# Patient Record
Sex: MALE | Race: BLACK OR AFRICAN AMERICAN | NOT HISPANIC OR LATINO | Employment: OTHER | URBAN - METROPOLITAN AREA
[De-identification: names, ages, dates, MRNs, and addresses within clinical notes are randomized per-mention and may not be internally consistent; named-entity substitution may affect disease eponyms.]

---

## 2020-09-26 PROBLEM — J93.11 PRIMARY SPONTANEOUS PNEUMOTHORAX: Status: ACTIVE | Noted: 2020-09-26

## 2020-09-27 PROBLEM — J93.11 PRIMARY SPONTANEOUS PNEUMOTHORAX: Chronic | Status: ACTIVE | Noted: 2020-09-26

## 2020-09-29 PROBLEM — J93.11 PRIMARY SPONTANEOUS PNEUMOTHORAX: Chronic | Status: RESOLVED | Noted: 2020-09-26 | Resolved: 2020-09-29

## 2021-04-23 PROBLEM — E11.9 TYPE 2 DIABETES MELLITUS WITHOUT COMPLICATION: Status: ACTIVE | Noted: 2021-04-23

## 2021-04-23 PROBLEM — I10 ESSENTIAL HYPERTENSION: Status: ACTIVE | Noted: 2021-04-23

## 2021-04-23 PROBLEM — J41.1 MUCOPURULENT CHRONIC BRONCHITIS: Status: ACTIVE | Noted: 2021-04-23

## 2022-02-26 PROBLEM — J44.1 ACUTE EXACERBATION OF CHRONIC OBSTRUCTIVE PULMONARY DISEASE (COPD): Status: ACTIVE | Noted: 2022-02-26

## 2022-02-26 PROBLEM — J18.9 PNEUMONIA OF BOTH LOWER LOBES DUE TO INFECTIOUS ORGANISM: Status: ACTIVE | Noted: 2022-02-26

## 2022-03-26 PROBLEM — Z79.4 TYPE 2 DIABETES MELLITUS WITH HYPERGLYCEMIA, WITH LONG-TERM CURRENT USE OF INSULIN: Status: RESOLVED | Noted: 2021-04-23 | Resolved: 2022-03-26

## 2022-03-26 PROBLEM — Z79.4 TYPE 2 DIABETES MELLITUS WITH HYPERGLYCEMIA, WITH LONG-TERM CURRENT USE OF INSULIN: Status: ACTIVE | Noted: 2021-04-23

## 2022-03-26 PROBLEM — E11.65 TYPE 2 DIABETES MELLITUS WITH HYPERGLYCEMIA, WITH LONG-TERM CURRENT USE OF INSULIN: Status: ACTIVE | Noted: 2021-04-23

## 2022-03-26 PROBLEM — E11.65 TYPE 2 DIABETES MELLITUS WITH HYPERGLYCEMIA, WITH LONG-TERM CURRENT USE OF INSULIN: Status: RESOLVED | Noted: 2021-04-23 | Resolved: 2022-03-26

## 2022-03-27 PROBLEM — K21.00 GASTROESOPHAGEAL REFLUX DISEASE WITH ESOPHAGITIS WITHOUT HEMORRHAGE: Status: ACTIVE | Noted: 2020-11-23

## 2022-03-27 PROBLEM — Z12.11 COLON CANCER SCREENING: Status: ACTIVE | Noted: 2020-08-14

## 2022-03-27 PROBLEM — J93.9 PNEUMOTHORAX ON RIGHT: Status: ACTIVE | Noted: 2020-10-17

## 2022-03-27 PROBLEM — F41.0 PANIC ANXIETY SYNDROME: Status: RESOLVED | Noted: 2020-08-12 | Resolved: 2022-03-27

## 2022-03-27 PROBLEM — F41.0 PANIC ANXIETY SYNDROME: Status: ACTIVE | Noted: 2020-08-12

## 2022-03-30 PROBLEM — J44.1 COPD EXACERBATION: Status: RESOLVED | Noted: 2022-02-26 | Resolved: 2022-03-30

## 2022-03-31 ENCOUNTER — PATIENT MESSAGE (OUTPATIENT)
Dept: ADMINISTRATIVE | Facility: CLINIC | Age: 62
End: 2022-03-31
Payer: COMMERCIAL

## 2022-03-31 ENCOUNTER — PATIENT OUTREACH (OUTPATIENT)
Dept: ADMINISTRATIVE | Facility: CLINIC | Age: 62
End: 2022-03-31
Payer: COMMERCIAL

## 2022-03-31 NOTE — PROGRESS NOTES
C3 nurse attempted to contact [facility] for a [facility] post hospital discharge follow up call. No answer. Left voicemail with callback information. The patient does not have a scheduled HOSFU appointment. Non [facility] PCP

## 2022-04-01 NOTE — PROGRESS NOTES
C3 nurse attempted to contact [facility]  for a T[facility] post hospital discharge follow up call. No answer. No voicemail available.The patient does not have a scheduled HOSFU appointment. Non [facility] PCP

## 2022-09-12 PROBLEM — T17.908A ASPIRATION INTO AIRWAY: Status: ACTIVE | Noted: 2022-09-12

## 2022-10-11 PROBLEM — K02.9 CARIES: Status: ACTIVE | Noted: 2022-10-11

## 2022-12-21 PROBLEM — K08.109 EDENTULOUS: Status: ACTIVE | Noted: 2022-12-21

## 2023-07-30 PROBLEM — R06.02 SHORTNESS OF BREATH: Status: ACTIVE | Noted: 2023-07-30

## 2023-07-30 PROBLEM — J44.9 CHRONIC OBSTRUCTIVE PULMONARY DISEASE: Status: ACTIVE | Noted: 2023-07-30

## 2023-07-30 PROBLEM — J44.1 COPD EXACERBATION: Status: ACTIVE | Noted: 2023-07-30

## 2023-09-13 PROBLEM — F41.9 ANXIETY: Status: ACTIVE | Noted: 2023-09-13

## 2023-09-13 PROBLEM — J18.9 PNEUMONIA: Status: ACTIVE | Noted: 2023-09-13

## 2023-12-18 PROBLEM — J18.9 PNEUMONIA: Status: RESOLVED | Noted: 2023-09-13 | Resolved: 2023-12-18
